# Patient Record
(demographics unavailable — no encounter records)

---

## 2025-02-27 NOTE — HEALTH RISK ASSESSMENT
[No] : No [No falls in past year] : Patient reported no falls in the past year [0] : 2) Feeling down, depressed, or hopeless: Not at all (0) [PHQ-2 Negative - No further assessment needed] : PHQ-2 Negative - No further assessment needed [Never] : Never [de-identified] : endocrinology [[id number]] : 0

## 2025-02-27 NOTE — HEALTH RISK ASSESSMENT
[No] : No [No falls in past year] : Patient reported no falls in the past year [0] : 2) Feeling down, depressed, or hopeless: Not at all (0) [PHQ-2 Negative - No further assessment needed] : PHQ-2 Negative - No further assessment needed [Never] : Never [de-identified] : endocrinology [[id number]] : 0

## 2025-02-27 NOTE — PHYSICAL EXAM
[No Acute Distress] : no acute distress [Well Nourished] : well nourished [Well Developed] : well developed [Well-Appearing] : well-appearing [Normal Sclera/Conjunctiva] : normal sclera/conjunctiva [Normal Outer Ear/Nose] : the outer ears and nose were normal in appearance [No JVD] : no jugular venous distention [No Respiratory Distress] : no respiratory distress  [No Accessory Muscle Use] : no accessory muscle use [Clear to Auscultation] : lungs were clear to auscultation bilaterally [Normal Rate] : normal rate  [Regular Rhythm] : with a regular rhythm [Normal S1, S2] : normal S1 and S2 [No Murmur] : no murmur heard [No Edema] : there was no peripheral edema [Soft] : abdomen soft [Non Tender] : non-tender [Normal Bowel Sounds] : normal bowel sounds [Normal Gait] : normal gait [Normal Affect] : the affect was normal [Normal Insight/Judgement] : insight and judgment were intact

## 2025-02-27 NOTE — PLAN
[FreeTextEntry1] : Patient presents to establish care.  Reviewed CBC, CMP, vitamin b12, a1c, thyroid testing and hormonal testing. Overall labs are okay with low testosterone and high DHEA.  Seeing endo.  stopped his testosterone boosting supplement.  #abdominal pain unclear cause.  CT and MRI reviewed from 2024 which only shows an adrenal nodule Likely is MSK Will do regular exercise and monitor for hernia Will see GI for possible further imaging. Will given surgery referral in event he notices a hernia  #fatigue #snoring Likely has a sleep disorder, but unclear if sleep apnea or parasomnia Will refer to sleep medicine Will start Vitamin b12 1000mcg qd due to Vitamin b12 level of 400's.

## 2025-03-01 NOTE — HISTORY OF PRESENT ILLNESS
[FreeTextEntry8] : Patient presents to [facility] care. 34 yo M w/ PMHx of adrenal nodule, low testosterone. Has been having left stomach back for 1 year.  Improved recently with exercise.   Had an CT and MRI for left stomach pain. last year and was found to have a left adrenal nodule.  No other findings found.  Was found to have low testosterone and saw an endocrinology.  He changed him lifestyle and overall feels better.  Never usually feels rested.  Denies any snoring, morning headaches.  Occasionally has snoring.  [FreeTextEntry1] : Pt presents today for a CPE. Pt states he is feeling pain in the L side of stomach for about a year.

## 2025-03-01 NOTE — HISTORY OF PRESENT ILLNESS
[FreeTextEntry8] : Patient presents to [facility] care. 32 yo M w/ PMHx of adrenal nodule, low testosterone. Has been having left stomach back for 1 year.  Improved recently with exercise.   Had an CT and MRI for left stomach pain. last year and was found to have a left adrenal nodule.  No other findings found.  Was found to have low testosterone and saw an endocrinology.  He changed him lifestyle and overall feels better.  Never usually feels rested.  Denies any snoring, morning headaches.  Occasionally has snoring.  [FreeTextEntry1] : Pt presents today for a CPE. Pt states he is feeling pain in the L side of stomach for about a year.